# Patient Record
Sex: FEMALE | Race: BLACK OR AFRICAN AMERICAN | NOT HISPANIC OR LATINO | Employment: STUDENT | RURAL
[De-identification: names, ages, dates, MRNs, and addresses within clinical notes are randomized per-mention and may not be internally consistent; named-entity substitution may affect disease eponyms.]

---
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
## 2023-01-04 ENCOUNTER — OFFICE VISIT (OUTPATIENT)
Dept: FAMILY MEDICINE | Facility: CLINIC | Age: 18
End: 2023-01-04
Payer: MEDICAID

## 2023-01-04 VITALS
HEIGHT: 64 IN | OXYGEN SATURATION: 98 % | BODY MASS INDEX: 21.34 KG/M2 | DIASTOLIC BLOOD PRESSURE: 60 MMHG | TEMPERATURE: 98 F | SYSTOLIC BLOOD PRESSURE: 102 MMHG | WEIGHT: 125 LBS | HEART RATE: 80 BPM

## 2023-01-04 DIAGNOSIS — B00.1 FEVER BLISTER: Primary | ICD-10-CM

## 2023-01-04 DIAGNOSIS — F33.1 MODERATE EPISODE OF RECURRENT MAJOR DEPRESSIVE DISORDER: ICD-10-CM

## 2023-01-04 PROCEDURE — 3008F PR BODY MASS INDEX (BMI) DOCUMENTED: ICD-10-PCS | Mod: ,,, | Performed by: FAMILY MEDICINE

## 2023-01-04 PROCEDURE — 3074F PR MOST RECENT SYSTOLIC BLOOD PRESSURE < 130 MM HG: ICD-10-PCS | Mod: ,,, | Performed by: FAMILY MEDICINE

## 2023-01-04 PROCEDURE — 3008F BODY MASS INDEX DOCD: CPT | Mod: ,,, | Performed by: FAMILY MEDICINE

## 2023-01-04 PROCEDURE — 99213 OFFICE O/P EST LOW 20 MIN: CPT | Mod: ,,, | Performed by: FAMILY MEDICINE

## 2023-01-04 PROCEDURE — 3074F SYST BP LT 130 MM HG: CPT | Mod: ,,, | Performed by: FAMILY MEDICINE

## 2023-01-04 PROCEDURE — 3078F PR MOST RECENT DIASTOLIC BLOOD PRESSURE < 80 MM HG: ICD-10-PCS | Mod: ,,, | Performed by: FAMILY MEDICINE

## 2023-01-04 PROCEDURE — 99213 PR OFFICE/OUTPT VISIT, EST, LEVL III, 20-29 MIN: ICD-10-PCS | Mod: ,,, | Performed by: FAMILY MEDICINE

## 2023-01-04 PROCEDURE — 3078F DIAST BP <80 MM HG: CPT | Mod: ,,, | Performed by: FAMILY MEDICINE

## 2023-01-04 RX ORDER — ACYCLOVIR 800 MG/1
800 TABLET ORAL
Qty: 35 TABLET | Refills: 1 | Status: SHIPPED | OUTPATIENT
Start: 2023-01-04 | End: 2024-01-04

## 2023-01-04 NOTE — PROGRESS NOTES
Hussein Cain MD   [facility] [place]  [address] [place] [address]     PATIENT NAME: Vasiliy Ferrell  : 2005  DATE: 23  MRN: [id number]      Billing Provider: Hussein Cain MD  Level of Service:   Patient PCP Information       Provider PCP Type    Hussein Cain MD General            Reason for Visit / Chief Complaint: Depression (Discuss changing her BCP because she thinks it is making her depressed. States she is taking Lexapro 10 mg, but does not think it is helping.) and Blister (Blister x 2 to upper lip for 1 week)         History of Present Illness / Problem Focused Workflow     Vasiliy Ferrell presents to the clinic with Depression (Discuss changing her BCP because she thinks it is making her depressed. States she is taking Lexapro 10 mg, but does not think it is helping.) and Blister (Blister x 2 to upper lip for 1 week)     HPI    Review of Systems     Review of Systems   Constitutional:  Negative for activity change, appetite change, fatigue and fever.   HENT:  Negative for nasal congestion, ear pain, hearing loss, sinus pressure/congestion and sore throat.    Respiratory:  Negative for cough, chest tightness and shortness of breath.    Cardiovascular:  Negative for chest pain and palpitations.   Gastrointestinal:  Negative for abdominal pain and fecal incontinence.   Genitourinary:  Negative for bladder incontinence and difficulty urinating.   Musculoskeletal:  Negative for arthralgias.   Integumentary:  Negative for rash.   Neurological:  Negative for dizziness and headaches.      Medical / Social / Family History   History reviewed. No pertinent past medical history.    Past Surgical History:   Procedure Laterality Date    BREAST SURGERY Left     cyst extraction       Social History  Vasiliy Ferrell  reports that she has never smoked. She has never used smokeless tobacco. She reports that she does not currently use drugs after having used the  following drugs: Marijuana. She reports that she does not drink alcohol.    Family History  Vasiliy Ferrell  family history includes Diabetes in her maternal grandmother and maternal uncle; Hypertension in her maternal grandmother and maternal uncle.    Medications and Allergies     Medications  Outpatient Medications Marked as Taking for the 1/4/23 encounter (Office Visit) with Hussein Cain MD   Medication Sig Dispense Refill    cetirizine (ZYRTEC) 5 MG tablet Take 2 tablets (10 mg total) by mouth once daily. 90 tablet 1    EScitalopram oxalate (LEXAPRO) 10 MG tablet Take 1 tablet (10 mg total) by mouth once daily. 90 tablet 0    norgestimate-ethinyl estradioL (ORTHO TRI-CYCLEN LO) 0.18/0.215/0.25 mg-25 mcg tablet Take 1 tablet by mouth once daily. 30 tablet 11       Allergies  Review of patient's allergies indicates:  No Known Allergies    Physical Examination     Vitals:    01/04/23 0915   BP: 102/60   Pulse: 80   Temp: 97.7 °F (36.5 °C)     Physical Exam  Constitutional:       General: She is not in acute distress.     Appearance: She is not ill-appearing.   HENT:      Head: Normocephalic and atraumatic.      Right Ear: Tympanic membrane and ear canal normal.      Left Ear: Tympanic membrane and ear canal normal.      Nose: Nose normal. No congestion or rhinorrhea.   Eyes:      Pupils: Pupils are equal, round, and reactive to light.   Cardiovascular:      Rate and Rhythm: Normal rate and regular rhythm.      Pulses: Normal pulses.      Heart sounds: No murmur heard.  Pulmonary:      Effort: No respiratory distress.      Breath sounds: No wheezing, rhonchi or rales.   Abdominal:      General: Bowel sounds are normal.      Palpations: Abdomen is soft.      Tenderness: There is no abdominal tenderness.      Hernia: No hernia is present.   Musculoskeletal:      Cervical back: Normal range of motion and neck supple.   Lymphadenopathy:      Cervical: No cervical adenopathy.   Skin:     General: Skin is warm and  dry.   Neurological:      Mental Status: She is alert.   Psychiatric:         Behavior: Behavior normal.         Thought Content: Thought content normal.        Assessment and Plan (including Health Maintenance)   :    Plan:         Health Maintenance Due   Topic Date Due    Hepatitis C Screening  Never done    Lipid Panel  Never done    COVID-19 Vaccine (1) Never done    HPV Vaccines (1 - 2-dose series) Never done    HIV Screening  Never done    Chlamydia Screening  Never done    TETANUS VACCINE  Never done    Influenza Vaccine (1) Never done       Problem List Items Addressed This Visit    None    There are no diagnoses linked to this encounter.   The patient has no Health Maintenance topics of status Not Due    Procedures     No future appointments.     No follow-ups on file.       Signature:  Hussein Cain MD  [facility]  [address] [place] [address]  [phone] Phone  [phone] Fax    Date of encounter: 1/4/23

## 2023-06-29 ENCOUNTER — OFFICE VISIT (OUTPATIENT)
Dept: FAMILY MEDICINE | Facility: CLINIC | Age: 18
End: 2023-06-29
Payer: MEDICAID

## 2023-06-29 VITALS
HEIGHT: 64 IN | DIASTOLIC BLOOD PRESSURE: 64 MMHG | HEART RATE: 75 BPM | BODY MASS INDEX: 22.57 KG/M2 | TEMPERATURE: 99 F | OXYGEN SATURATION: 98 % | WEIGHT: 132.19 LBS | SYSTOLIC BLOOD PRESSURE: 108 MMHG

## 2023-06-29 DIAGNOSIS — Z79.899 LONG-TERM USE OF HIGH-RISK MEDICATION: Primary | ICD-10-CM

## 2023-06-29 DIAGNOSIS — F33.1 MODERATE EPISODE OF RECURRENT MAJOR DEPRESSIVE DISORDER: ICD-10-CM

## 2023-06-29 LAB
ALBUMIN SERPL BCP-MCNC: 3.9 G/DL (ref 3.5–5)
ALBUMIN/GLOB SERPL: 1 {RATIO}
ALP SERPL-CCNC: 59 U/L (ref 52–144)
ALT SERPL W P-5'-P-CCNC: 13 U/L (ref 13–56)
ANION GAP SERPL CALCULATED.3IONS-SCNC: 12 MMOL/L (ref 7–16)
AST SERPL W P-5'-P-CCNC: 10 U/L (ref 15–37)
BASOPHILS # BLD AUTO: 0.06 K/UL (ref 0–0.2)
BASOPHILS NFR BLD AUTO: 0.8 % (ref 0–1)
BILIRUB SERPL-MCNC: 0.6 MG/DL (ref ?–1.2)
BUN SERPL-MCNC: 9 MG/DL (ref 7–18)
BUN/CREAT SERPL: 14 (ref 6–20)
CALCIUM SERPL-MCNC: 9.2 MG/DL (ref 8.5–10.1)
CHLORIDE SERPL-SCNC: 106 MMOL/L (ref 98–107)
CO2 SERPL-SCNC: 26 MMOL/L (ref 21–32)
CREAT SERPL-MCNC: 0.64 MG/DL (ref 0.55–1.02)
DIFFERENTIAL METHOD BLD: ABNORMAL
EGFR (NO RACE VARIABLE) (RUSH/TITUS): 131 ML/MIN/1.73M2
EOSINOPHIL # BLD AUTO: 0.34 K/UL (ref 0–0.5)
EOSINOPHIL NFR BLD AUTO: 4.8 % (ref 1–4)
ERYTHROCYTE [DISTWIDTH] IN BLOOD BY AUTOMATED COUNT: 12 % (ref 11.5–14.5)
GLOBULIN SER-MCNC: 3.8 G/DL (ref 2–4)
GLUCOSE SERPL-MCNC: 75 MG/DL (ref 74–106)
HCT VFR BLD AUTO: 39.6 % (ref 38–47)
HGB BLD-MCNC: 12.8 G/DL (ref 12–16)
IMM GRANULOCYTES # BLD AUTO: 0.01 K/UL (ref 0–0.04)
IMM GRANULOCYTES NFR BLD: 0.1 % (ref 0–0.4)
LYMPHOCYTES # BLD AUTO: 2.24 K/UL (ref 1–4.8)
LYMPHOCYTES NFR BLD AUTO: 31.5 % (ref 27–41)
MCH RBC QN AUTO: 29.6 PG (ref 27–31)
MCHC RBC AUTO-ENTMCNC: 32.3 G/DL (ref 32–36)
MCV RBC AUTO: 91.5 FL (ref 80–96)
MONOCYTES # BLD AUTO: 0.37 K/UL (ref 0–0.8)
MONOCYTES NFR BLD AUTO: 5.2 % (ref 2–6)
MPC BLD CALC-MCNC: 10.9 FL (ref 9.4–12.4)
NEUTROPHILS # BLD AUTO: 4.09 K/UL (ref 1.8–7.7)
NEUTROPHILS NFR BLD AUTO: 57.6 % (ref 53–65)
NRBC # BLD AUTO: 0 X10E3/UL
NRBC, AUTO (.00): 0 %
PLATELET # BLD AUTO: 353 K/UL (ref 150–400)
POTASSIUM SERPL-SCNC: 4.2 MMOL/L (ref 3.5–5.1)
PROT SERPL-MCNC: 7.7 G/DL (ref 6.4–8.2)
RBC # BLD AUTO: 4.33 M/UL (ref 4.2–5.4)
SODIUM SERPL-SCNC: 140 MMOL/L (ref 136–145)
WBC # BLD AUTO: 7.11 K/UL (ref 4.5–11)

## 2023-06-29 PROCEDURE — 3008F PR BODY MASS INDEX (BMI) DOCUMENTED: ICD-10-PCS | Mod: ,,, | Performed by: FAMILY MEDICINE

## 2023-06-29 PROCEDURE — 99213 PR OFFICE/OUTPT VISIT, EST, LEVL III, 20-29 MIN: ICD-10-PCS | Mod: ,,, | Performed by: FAMILY MEDICINE

## 2023-06-29 PROCEDURE — 85025 COMPLETE CBC W/AUTO DIFF WBC: CPT | Mod: ,,, | Performed by: CLINICAL MEDICAL LABORATORY

## 2023-06-29 PROCEDURE — 3008F BODY MASS INDEX DOCD: CPT | Mod: ,,, | Performed by: FAMILY MEDICINE

## 2023-06-29 PROCEDURE — 99213 OFFICE O/P EST LOW 20 MIN: CPT | Mod: ,,, | Performed by: FAMILY MEDICINE

## 2023-06-29 PROCEDURE — 85025 CBC WITH DIFFERENTIAL: ICD-10-PCS | Mod: ,,, | Performed by: CLINICAL MEDICAL LABORATORY

## 2023-06-29 PROCEDURE — 80053 COMPREHEN METABOLIC PANEL: CPT | Mod: ,,, | Performed by: CLINICAL MEDICAL LABORATORY

## 2023-06-29 PROCEDURE — 3078F PR MOST RECENT DIASTOLIC BLOOD PRESSURE < 80 MM HG: ICD-10-PCS | Mod: ,,, | Performed by: FAMILY MEDICINE

## 2023-06-29 PROCEDURE — 3078F DIAST BP <80 MM HG: CPT | Mod: ,,, | Performed by: FAMILY MEDICINE

## 2023-06-29 PROCEDURE — 3074F PR MOST RECENT SYSTOLIC BLOOD PRESSURE < 130 MM HG: ICD-10-PCS | Mod: ,,, | Performed by: FAMILY MEDICINE

## 2023-06-29 PROCEDURE — 80053 COMPREHENSIVE METABOLIC PANEL: ICD-10-PCS | Mod: ,,, | Performed by: CLINICAL MEDICAL LABORATORY

## 2023-06-29 PROCEDURE — 3074F SYST BP LT 130 MM HG: CPT | Mod: ,,, | Performed by: FAMILY MEDICINE

## 2023-06-29 RX ORDER — ESCITALOPRAM OXALATE 10 MG/1
10 TABLET ORAL DAILY
Qty: 90 TABLET | Refills: 1 | Status: SHIPPED | OUTPATIENT
Start: 2023-06-29 | End: 2023-11-07 | Stop reason: SDUPTHER

## 2023-06-29 NOTE — PROGRESS NOTES
Hussein Cain MD   [facility]  [address] [place] [address]     PATIENT NAME: Vasiliy Ferrell  : 2005  DATE: 23  MRN: [id number]      Billing Provider: Hussein Cain MD  Level of Service: [place] OFFICE/OUTPT VISIT, EST, LEVL III, 20-29 MIN  Patient PCP Information       Provider PCP Type    Hussein Cain MD General            Reason for Visit / Chief Complaint: Depression (Refill on lexapro)         History of Present Illness / Problem Focused Workflow     Vasiliy Ferrell presents to the clinic with Depression (Refill on lexapro)     HPI    Review of Systems     Review of Systems   Constitutional:  Negative for activity change, appetite change, fatigue and fever.   HENT:  Negative for nasal congestion, ear pain, hearing loss, sinus pressure/congestion and sore throat.    Respiratory:  Negative for cough, chest tightness and shortness of breath.    Cardiovascular:  Negative for chest pain and palpitations.   Gastrointestinal:  Negative for abdominal pain and fecal incontinence.   Genitourinary:  Negative for bladder incontinence and difficulty urinating.   Musculoskeletal:  Negative for arthralgias.   Integumentary:  Negative for rash.   Neurological:  Negative for dizziness and headaches.      Medical / Social / Family History   History reviewed. No pertinent past medical history.    Past Surgical History:   Procedure Laterality Date    BREAST SURGERY Left     cyst extraction       Social History  Vasiliy Ferrell  reports that she has never smoked. She has never used smokeless tobacco. She reports that she does not currently use drugs after having used the following drugs: Marijuana. She reports that she does not drink alcohol.    Family History  Vasiliy Ferrell  family history includes Diabetes in her maternal grandmother and maternal uncle; Hypertension in her maternal grandmother and maternal uncle.    Medications and Allergies     Medications  Outpatient  Medications Marked as Taking for the 6/29/23 encounter (Office Visit) with Hussein Cain MD   Medication Sig Dispense Refill    cetirizine (ZYRTEC) 5 MG tablet Take 2 tablets (10 mg total) by mouth once daily. 90 tablet 1    norethindrone-ethinyl estradiol (ORTHO-NOVUM 7/7/7, 28,) 0.5/0.75/1 mg- 35 mcg per tablet Take 1 tablet by mouth once daily. 30 tablet 11    [DISCONTINUED] EScitalopram oxalate (LEXAPRO) 10 MG tablet Take 1 tablet (10 mg total) by mouth once daily. 90 tablet 0       Allergies  Review of patient's allergies indicates:  No Known Allergies    Physical Examination     Vitals:    06/29/23 0905   BP: 108/64   Pulse: 75   Temp: 98.5 °F (36.9 °C)     Physical Exam  Constitutional:       General: She is not in acute distress.     Appearance: She is not ill-appearing.   HENT:      Head: Normocephalic and atraumatic.      Right Ear: Tympanic membrane and ear canal normal.      Left Ear: Tympanic membrane and ear canal normal.      Nose: Nose normal. No congestion or rhinorrhea.   Eyes:      Pupils: Pupils are equal, round, and reactive to light.   Cardiovascular:      Rate and Rhythm: Normal rate and regular rhythm.      Pulses: Normal pulses.      Heart sounds: No murmur heard.  Pulmonary:      Effort: No respiratory distress.      Breath sounds: No wheezing, rhonchi or rales.   Abdominal:      General: Bowel sounds are normal.      Palpations: Abdomen is soft.      Tenderness: There is no abdominal tenderness.      Hernia: No hernia is present.   Musculoskeletal:      Cervical back: Normal range of motion and neck supple.   Lymphadenopathy:      Cervical: No cervical adenopathy.   Skin:     General: Skin is warm and dry.   Neurological:      Mental Status: She is alert.   Psychiatric:         Behavior: Behavior normal.         Thought Content: Thought content normal.        Assessment and Plan (including Health Maintenance)   :    Plan:         Health Maintenance Due   Topic Date Due    Hepatitis  C Screening  Never done    Lipid Panel  Never done    COVID-19 Vaccine (1) Never done    HPV Vaccines (1 - 2-dose series) Never done    HIV Screening  Never done    TETANUS VACCINE  Never done    Chlamydia Screening  09/30/2022       Problem List Items Addressed This Visit    None  Visit Diagnoses       Long-term use of high-risk medication    -  Primary    Relevant Orders    CBC Auto Differential    Comprehensive Metabolic Panel    Moderate episode of recurrent major depressive disorder              Long-term use of high-risk medication  -     CBC Auto Differential; Future; Expected date: 06/29/2023  -     Comprehensive Metabolic Panel; Future; Expected date: 06/29/2023    Moderate episode of recurrent major depressive disorder    Other orders  -     EScitalopram oxalate (LEXAPRO) 10 MG tablet; Take 1 tablet (10 mg total) by mouth once daily.  Dispense: 90 tablet; Refill: 1       Health Maintenance Topics with due status: Not Due       Topic Last Completion Date    Influenza Vaccine Not Due       Procedures     No future appointments.     No follow-ups on file.       Signature:  Hussein Cain MD  [facility]  [address] [place] [address]  [phone] Phone  [phone] Fax    Date of encounter: 6/29/23

## 2023-07-11 ENCOUNTER — TELEPHONE (OUTPATIENT)
Dept: FAMILY MEDICINE | Facility: CLINIC | Age: 18
End: 2023-07-11
Payer: MEDICAID

## 2023-07-11 NOTE — TELEPHONE ENCOUNTER
Spoke with pharmacy. Patient has refills at pharmacy. Patient will be required to pay cash price. Patient verbalized understanding.

## 2023-07-11 NOTE — TELEPHONE ENCOUNTER
----- Message from Ary Killian sent at 7/11/2023 12:23 PM CDT -----  Pt lost a month's rx for bc. Needs some sent to [facility].  [phone]

## 2023-11-06 RX ORDER — NORETHINDRONE AND ETHINYL ESTRADIOL 7 DAYS X 3
1 KIT ORAL
Qty: 90 TABLET | Refills: 0 | Status: SHIPPED | OUTPATIENT
Start: 2023-11-06 | End: 2023-11-07 | Stop reason: SDUPTHER

## 2023-11-06 NOTE — TELEPHONE ENCOUNTER
----- Message from Tanya Wilkerson sent at 11/6/2023  9:13 AM CST -----  Pt needs a refill on BCP's. She is out. Call pt to let her know if she needs an appt to get this or can this be called in. [phone].

## 2023-11-07 ENCOUNTER — OFFICE VISIT (OUTPATIENT)
Dept: FAMILY MEDICINE | Facility: CLINIC | Age: 18
End: 2023-11-07
Payer: MEDICAID

## 2023-11-07 VITALS
OXYGEN SATURATION: 99 % | BODY MASS INDEX: 24.04 KG/M2 | WEIGHT: 140.81 LBS | TEMPERATURE: 98 F | HEART RATE: 67 BPM | HEIGHT: 64 IN | SYSTOLIC BLOOD PRESSURE: 102 MMHG | DIASTOLIC BLOOD PRESSURE: 62 MMHG

## 2023-11-07 DIAGNOSIS — Z11.8 SCREENING FOR CHLAMYDIAL DISEASE: ICD-10-CM

## 2023-11-07 DIAGNOSIS — Z30.41 ENCOUNTER FOR SURVEILLANCE OF CONTRACEPTIVE PILLS: Primary | ICD-10-CM

## 2023-11-07 LAB
B-HCG UR QL: NEGATIVE
CTP QC/QA: YES

## 2023-11-07 PROCEDURE — 3008F BODY MASS INDEX DOCD: CPT | Mod: ,,, | Performed by: FAMILY MEDICINE

## 2023-11-07 PROCEDURE — 87591 CHLAMYDIA/GONORRHOEAE(GC), PCR: ICD-10-PCS | Mod: ,,, | Performed by: CLINICAL MEDICAL LABORATORY

## 2023-11-07 PROCEDURE — 3074F PR MOST RECENT SYSTOLIC BLOOD PRESSURE < 130 MM HG: ICD-10-PCS | Mod: ,,, | Performed by: FAMILY MEDICINE

## 2023-11-07 PROCEDURE — 87491 CHLMYD TRACH DNA AMP PROBE: CPT | Mod: ,,, | Performed by: CLINICAL MEDICAL LABORATORY

## 2023-11-07 PROCEDURE — 99213 PR OFFICE/OUTPT VISIT, EST, LEVL III, 20-29 MIN: ICD-10-PCS | Mod: ,,, | Performed by: FAMILY MEDICINE

## 2023-11-07 PROCEDURE — 3008F PR BODY MASS INDEX (BMI) DOCUMENTED: ICD-10-PCS | Mod: ,,, | Performed by: FAMILY MEDICINE

## 2023-11-07 PROCEDURE — 3074F SYST BP LT 130 MM HG: CPT | Mod: ,,, | Performed by: FAMILY MEDICINE

## 2023-11-07 PROCEDURE — 81025 POCT URINE PREGNANCY: ICD-10-PCS | Mod: QW,,, | Performed by: FAMILY MEDICINE

## 2023-11-07 PROCEDURE — 3078F PR MOST RECENT DIASTOLIC BLOOD PRESSURE < 80 MM HG: ICD-10-PCS | Mod: ,,, | Performed by: FAMILY MEDICINE

## 2023-11-07 PROCEDURE — 3078F DIAST BP <80 MM HG: CPT | Mod: ,,, | Performed by: FAMILY MEDICINE

## 2023-11-07 PROCEDURE — 81025 URINE PREGNANCY TEST: CPT | Mod: QW,,, | Performed by: FAMILY MEDICINE

## 2023-11-07 PROCEDURE — 99213 OFFICE O/P EST LOW 20 MIN: CPT | Mod: ,,, | Performed by: FAMILY MEDICINE

## 2023-11-07 PROCEDURE — 87591 N.GONORRHOEAE DNA AMP PROB: CPT | Mod: ,,, | Performed by: CLINICAL MEDICAL LABORATORY

## 2023-11-07 PROCEDURE — 87491 CHLAMYDIA/GONORRHOEAE(GC), PCR: ICD-10-PCS | Mod: ,,, | Performed by: CLINICAL MEDICAL LABORATORY

## 2023-11-07 RX ORDER — ESCITALOPRAM OXALATE 10 MG/1
10 TABLET ORAL DAILY
Qty: 90 TABLET | Refills: 1 | Status: SHIPPED | OUTPATIENT
Start: 2023-11-07

## 2023-11-07 RX ORDER — CETIRIZINE HYDROCHLORIDE 5 MG/1
10 TABLET ORAL DAILY
Qty: 90 TABLET | Refills: 1 | Status: SHIPPED | OUTPATIENT
Start: 2023-11-07 | End: 2024-03-06 | Stop reason: SDUPTHER

## 2023-11-07 RX ORDER — NORETHINDRONE AND ETHINYL ESTRADIOL 7 DAYS X 3
1 KIT ORAL DAILY
Qty: 90 TABLET | Refills: 3 | Status: SHIPPED | OUTPATIENT
Start: 2023-11-07

## 2023-11-07 NOTE — PROGRESS NOTES
Hussein Cain MD   [facility]  [address] [place] [address]     PATIENT NAME: Vasiliy Ferrell  : 2005  DATE: 23  MRN: [id number]      Billing Provider: Hussein Cain MD  Level of Service: [place] OFFICE/OUTPT VISIT, EST, LEVL III, 20-29 MIN  Patient PCP Information       Provider PCP Type    Hussein Cain MD General            Reason for Visit / Chief Complaint: Contraception (Yearly check up for birth control refill. Last PAP performed on 2022. Patient took last BC on Saturday. )         History of Present Illness / Problem Focused Workflow     Vasiliy Ferrell presents to the clinic with Contraception (Yearly check up for birth control refill. Last PAP performed on 2022. Patient took last BC on Saturday. )     HPI    Review of Systems     Review of Systems   Constitutional:  Negative for activity change, appetite change, fatigue and fever.   HENT:  Negative for nasal congestion, ear pain, hearing loss, sinus pressure/congestion and sore throat.    Respiratory:  Negative for cough, chest tightness and shortness of breath.    Cardiovascular:  Negative for chest pain and palpitations.   Gastrointestinal:  Negative for abdominal pain and fecal incontinence.   Genitourinary:  Negative for bladder incontinence and difficulty urinating.   Musculoskeletal:  Negative for arthralgias.   Integumentary:  Negative for rash.   Neurological:  Negative for dizziness and headaches.        Medical / Social / Family History   History reviewed. No pertinent past medical history.    Past Surgical History:   Procedure Laterality Date    BREAST SURGERY Left     cyst extraction       Social History  Vasiliy Ferrell  reports that she has never smoked. She has never used smokeless tobacco. She reports that she does not currently use drugs after having used the following drugs: Marijuana. She reports that she does not drink alcohol.    Family History  Vasiliy Ferrell  family  history includes Diabetes in her maternal grandmother and maternal uncle; Hypertension in her maternal grandmother and maternal uncle.    Medications and Allergies     Medications  Outpatient Medications Marked as Taking for the 11/7/23 encounter (Office Visit) with Hussein Cain MD   Medication Sig Dispense Refill    [DISCONTINUED] cetirizine (ZYRTEC) 5 MG tablet Take 2 tablets (10 mg total) by mouth once daily. 90 tablet 1    [DISCONTINUED] DASETTA 7/7/7, 28, 0.5/0.75/1 mg- 35 mcg per tablet TAKE 1 TABLET BY MOUTH ONCE DAILY. 90 tablet 0    [DISCONTINUED] EScitalopram oxalate (LEXAPRO) 10 MG tablet Take 1 tablet (10 mg total) by mouth once daily. 90 tablet 1       Allergies  Review of patient's allergies indicates:  No Known Allergies    Physical Examination     Vitals:    11/07/23 0804   BP: 102/62   Pulse: 67   Temp: 97.7 °F (36.5 °C)     Physical Exam  Constitutional:       General: She is not in acute distress.     Appearance: She is not ill-appearing.   HENT:      Head: Normocephalic and atraumatic.      Right Ear: Tympanic membrane and ear canal normal.      Left Ear: Tympanic membrane and ear canal normal.      Nose: Nose normal. No congestion or rhinorrhea.   Eyes:      Pupils: Pupils are equal, round, and reactive to light.   Cardiovascular:      Rate and Rhythm: Normal rate and regular rhythm.      Pulses: Normal pulses.      Heart sounds: No murmur heard.  Pulmonary:      Effort: No respiratory distress.      Breath sounds: No wheezing, rhonchi or rales.   Abdominal:      General: Bowel sounds are normal.      Palpations: Abdomen is soft.      Tenderness: There is no abdominal tenderness.      Hernia: No hernia is present.   Musculoskeletal:      Cervical back: Normal range of motion and neck supple.   Lymphadenopathy:      Cervical: No cervical adenopathy.   Skin:     General: Skin is warm and dry.   Neurological:      Mental Status: She is alert.   Psychiatric:         Behavior: Behavior  normal.         Thought Content: Thought content normal.          Assessment and Plan (including Health Maintenance)   :    Plan:         Health Maintenance Due   Topic Date Due    Hepatitis C Screening  Never done    Lipid Panel  Never done    COVID-19 Vaccine (1) Never done    HPV Vaccines (1 - 2-dose series) Never done    HIV Screening  Never done    TETANUS VACCINE  Never done    Influenza Vaccine (1) Never done       Problem List Items Addressed This Visit    None  Visit Diagnoses       Encounter for surveillance of contraceptive pills    -  Primary    Relevant Orders    POCT urine pregnancy (Completed)    Screening for chlamydial disease        Relevant Orders    Chlamydia/GC, PCR (Completed)          Encounter for surveillance of contraceptive pills  -     POCT urine pregnancy    Screening for chlamydial disease  -     Chlamydia/GC, PCR; Future; Expected date: 11/07/2023    Other orders  -     EScitalopram oxalate (LEXAPRO) 10 MG tablet; Take 1 tablet (10 mg total) by mouth once daily.  Dispense: 90 tablet; Refill: 1  -     norethindrone-ethinyl estradiol (DASETTA 7/7/7, 28,) 0.5/0.75/1 mg- 35 mcg per tablet; Take 1 tablet by mouth once daily.  Dispense: 90 tablet; Refill: 3  -     cetirizine (ZYRTEC) 5 MG tablet; Take 2 tablets (10 mg total) by mouth once daily.  Dispense: 90 tablet; Refill: 1       Health Maintenance Topics with due status: Not Due       Topic Last Completion Date    Chlamydia Screening 11/07/2023       Procedures     No future appointments.     No follow-ups on file.       Signature:  Hussein Cain MD  [facility]  [address] [place] [address]  [phone] Phone  [phone] Fax    Date of encounter: 11/7/23

## 2023-11-08 LAB
CHLAMYDIA BY PCR: POSITIVE
N. GONORRHOEAE (GC) BY PCR: NEGATIVE

## 2023-11-08 RX ORDER — AZITHROMYCIN 1 G/1
1 POWDER, FOR SUSPENSION ORAL ONCE
Qty: 1 PACKET | Refills: 0 | Status: SHIPPED | OUTPATIENT
Start: 2023-11-08 | End: 2023-11-08

## 2023-12-14 ENCOUNTER — OFFICE VISIT (OUTPATIENT)
Dept: FAMILY MEDICINE | Facility: CLINIC | Age: 18
End: 2023-12-14
Payer: MEDICAID

## 2023-12-14 VITALS
HEIGHT: 64 IN | SYSTOLIC BLOOD PRESSURE: 104 MMHG | DIASTOLIC BLOOD PRESSURE: 62 MMHG | BODY MASS INDEX: 24.84 KG/M2 | TEMPERATURE: 99 F | OXYGEN SATURATION: 98 % | HEART RATE: 101 BPM | WEIGHT: 145.5 LBS

## 2023-12-14 DIAGNOSIS — A74.9 CHLAMYDIA: ICD-10-CM

## 2023-12-14 DIAGNOSIS — Z72.51 HIGH RISK SEXUAL BEHAVIOR, UNSPECIFIED TYPE: Primary | ICD-10-CM

## 2023-12-14 PROCEDURE — 3078F DIAST BP <80 MM HG: CPT | Mod: ,,, | Performed by: FAMILY MEDICINE

## 2023-12-14 PROCEDURE — 3008F BODY MASS INDEX DOCD: CPT | Mod: ,,, | Performed by: FAMILY MEDICINE

## 2023-12-14 PROCEDURE — 3078F PR MOST RECENT DIASTOLIC BLOOD PRESSURE < 80 MM HG: ICD-10-PCS | Mod: ,,, | Performed by: FAMILY MEDICINE

## 2023-12-14 PROCEDURE — 87491 CHLMYD TRACH DNA AMP PROBE: CPT | Mod: ,,, | Performed by: CLINICAL MEDICAL LABORATORY

## 2023-12-14 PROCEDURE — 87491 CHLAMYDIA/GONORRHOEAE(GC), PCR: ICD-10-PCS | Mod: ,,, | Performed by: CLINICAL MEDICAL LABORATORY

## 2023-12-14 PROCEDURE — 96372 THER/PROPH/DIAG INJ SC/IM: CPT | Mod: ,,, | Performed by: FAMILY MEDICINE

## 2023-12-14 PROCEDURE — 99213 PR OFFICE/OUTPT VISIT, EST, LEVL III, 20-29 MIN: ICD-10-PCS | Mod: 25,,, | Performed by: FAMILY MEDICINE

## 2023-12-14 PROCEDURE — 99213 OFFICE O/P EST LOW 20 MIN: CPT | Mod: 25,,, | Performed by: FAMILY MEDICINE

## 2023-12-14 PROCEDURE — 3074F SYST BP LT 130 MM HG: CPT | Mod: ,,, | Performed by: FAMILY MEDICINE

## 2023-12-14 PROCEDURE — 96372 PR INJECTION,THERAP/PROPH/DIAG2ST, IM OR SUBCUT: ICD-10-PCS | Mod: ,,, | Performed by: FAMILY MEDICINE

## 2023-12-14 PROCEDURE — 3074F PR MOST RECENT SYSTOLIC BLOOD PRESSURE < 130 MM HG: ICD-10-PCS | Mod: ,,, | Performed by: FAMILY MEDICINE

## 2023-12-14 PROCEDURE — 3008F PR BODY MASS INDEX (BMI) DOCUMENTED: ICD-10-PCS | Mod: ,,, | Performed by: FAMILY MEDICINE

## 2023-12-14 PROCEDURE — 87591 N.GONORRHOEAE DNA AMP PROB: CPT | Mod: ,,, | Performed by: CLINICAL MEDICAL LABORATORY

## 2023-12-14 PROCEDURE — 87591 CHLAMYDIA/GONORRHOEAE(GC), PCR: ICD-10-PCS | Mod: ,,, | Performed by: CLINICAL MEDICAL LABORATORY

## 2023-12-14 RX ORDER — AZITHROMYCIN 500 MG/1
1000 TABLET, FILM COATED ORAL ONCE
Qty: 2 TABLET | Refills: 0 | Status: SHIPPED | OUTPATIENT
Start: 2023-12-14 | End: 2023-12-14

## 2023-12-14 RX ORDER — METHYLPREDNISOLONE ACETATE 80 MG/ML
40 INJECTION, SUSPENSION INTRA-ARTICULAR; INTRALESIONAL; INTRAMUSCULAR; SOFT TISSUE
Status: COMPLETED | OUTPATIENT
Start: 2023-12-14 | End: 2023-12-14

## 2023-12-14 RX ORDER — DEXAMETHASONE SODIUM PHOSPHATE 4 MG/ML
4 INJECTION, SOLUTION INTRA-ARTICULAR; INTRALESIONAL; INTRAMUSCULAR; INTRAVENOUS; SOFT TISSUE
Status: COMPLETED | OUTPATIENT
Start: 2023-12-14 | End: 2023-12-14

## 2023-12-14 RX ORDER — CEFTRIAXONE 1 G/1
1 INJECTION, POWDER, FOR SOLUTION INTRAMUSCULAR; INTRAVENOUS
Status: COMPLETED | OUTPATIENT
Start: 2023-12-14 | End: 2023-12-14

## 2023-12-14 RX ADMIN — METHYLPREDNISOLONE ACETATE 40 MG: 80 INJECTION, SUSPENSION INTRA-ARTICULAR; INTRALESIONAL; INTRAMUSCULAR; SOFT TISSUE at 10:12

## 2023-12-14 RX ADMIN — CEFTRIAXONE 1 G: 1 INJECTION, POWDER, FOR SOLUTION INTRAMUSCULAR; INTRAVENOUS at 10:12

## 2023-12-14 RX ADMIN — DEXAMETHASONE SODIUM PHOSPHATE 4 MG: 4 INJECTION, SOLUTION INTRA-ARTICULAR; INTRALESIONAL; INTRAMUSCULAR; INTRAVENOUS; SOFT TISSUE at 10:12

## 2023-12-14 NOTE — PROGRESS NOTES
Hussein Cain MD   [facility]  [address] [place] [address]     PATIENT NAME: Vasiliy Ferrell  : 2005  DATE: 23  MRN: [id number]      Billing Provider: Hussein Cain MD  Level of Service: [place] OFFICE/OUTPT VISIT, EST, LEVL III, 20-29 MIN  Patient PCP Information       Provider PCP Type    Hussein Cain MD General            Reason for Visit / Chief Complaint: Sinus Problem (C/o headache, cough, sneezing and runny nose - started this morning) and Exposure to STD (Patient positive for chlamydia on 23. Wants to be rechecked.)         History of Present Illness / Problem Focused Workflow     Vasiliy Ferrell presents to the clinic with Sinus Problem (C/o headache, cough, sneezing and runny nose - started this morning) and Exposure to STD (Patient positive for chlamydia on 23. Wants to be rechecked.)     HPI    Review of Systems     Review of Systems   Constitutional:  Negative for activity change, appetite change, fatigue and fever.   HENT:  Positive for nasal congestion, sinus pressure/congestion and sore throat. Negative for ear pain and hearing loss.    Respiratory:  Positive for cough. Negative for chest tightness and shortness of breath.    Cardiovascular:  Negative for chest pain and palpitations.   Gastrointestinal:  Negative for abdominal pain and fecal incontinence.   Genitourinary:  Negative for bladder incontinence and difficulty urinating.   Musculoskeletal:  Negative for arthralgias.   Integumentary:  Negative for rash.   Neurological:  Negative for dizziness and headaches.        Medical / Social / Family History   History reviewed. No pertinent past medical history.    Past Surgical History:   Procedure Laterality Date    BREAST SURGERY Left     cyst extraction       Social History  Vasiliy Ferrell  reports that she has never smoked. She has never used smokeless tobacco. She reports that she does not currently use drugs after having  used the following drugs: Marijuana. She reports that she does not drink alcohol.    Family History  Vasiliy Ferrell  family history includes Diabetes in her maternal grandmother and maternal uncle; Hypertension in her maternal grandmother and maternal uncle.    Medications and Allergies     Medications  Outpatient Medications Marked as Taking for the 12/14/23 encounter (Office Visit) with Hussein Cain MD   Medication Sig Dispense Refill    cetirizine (ZYRTEC) 5 MG tablet Take 2 tablets (10 mg total) by mouth once daily. 90 tablet 1    EScitalopram oxalate (LEXAPRO) 10 MG tablet Take 1 tablet (10 mg total) by mouth once daily. 90 tablet 1    norethindrone-ethinyl estradiol (DASETTA 7/7/7, 28,) 0.5/0.75/1 mg- 35 mcg per tablet Take 1 tablet by mouth once daily. 90 tablet 3       Allergies  Review of patient's allergies indicates:  No Known Allergies    Physical Examination     Vitals:    12/14/23 0915   BP: 104/62   Pulse: 101   Temp: 98.6 °F (37 °C)     Physical Exam  Constitutional:       Appearance: Normal appearance.   HENT:      Head: Normocephalic and atraumatic.      Right Ear: Tympanic membrane normal.      Left Ear: Tympanic membrane normal.      Nose: Congestion and rhinorrhea present.      Mouth/Throat:      Pharynx: Posterior oropharyngeal erythema present.   Eyes:      Pupils: Pupils are equal, round, and reactive to light.   Cardiovascular:      Rate and Rhythm: Normal rate and regular rhythm.      Pulses: Normal pulses.      Heart sounds: Normal heart sounds.   Pulmonary:      Breath sounds: No wheezing or rhonchi.   Abdominal:      Palpations: Abdomen is soft.   Lymphadenopathy:      Cervical: Cervical adenopathy present.   Skin:     General: Skin is warm and dry.   Neurological:      Mental Status: She is alert.          Assessment and Plan (including Health Maintenance)   :    Plan:         Health Maintenance Due   Topic Date Due    Hepatitis C Screening  Never done    Lipid Panel  Never  done    COVID-19 Vaccine (1) Never done    HPV Vaccines (1 - 2-dose series) Never done    HIV Screening  Never done    TETANUS VACCINE  Never done    Influenza Vaccine (1) Never done       Problem List Items Addressed This Visit    None  Visit Diagnoses       High risk sexual behavior, unspecified type    -  Primary    Relevant Orders    Chlamydia/GC, PCR (Completed)    Chlamydia              High risk sexual behavior, unspecified type  -     Chlamydia/GC, PCR; Future; Expected date: 12/14/2023    Chlamydia    Other orders  -     dexAMETHasone injection 4 mg  -     methylPREDNISolone acetate injection 40 mg  -     cefTRIAXone injection 1 g  -     azithromycin (ZITHROMAX) 500 MG tablet; Take 2 tablets (1,000 mg total) by mouth once. for 1 dose  Dispense: 2 tablet; Refill: 0       Health Maintenance Topics with due status: Not Due       Topic Last Completion Date    Chlamydia Screening 12/14/2023       Procedures     No future appointments.     No follow-ups on file.       Signature:  Hussein Cain MD  [facility]  [address] [place] [address]  [phone] Phone  [phone] Fax    Date of encounter: 12/14/23

## 2023-12-15 LAB
CHLAMYDIA BY PCR: POSITIVE
N. GONORRHOEAE (GC) BY PCR: NEGATIVE

## 2023-12-19 ENCOUNTER — TELEPHONE (OUTPATIENT)
Dept: FAMILY MEDICINE | Facility: CLINIC | Age: 18
End: 2023-12-19
Payer: MEDICAID

## 2023-12-19 RX ORDER — AZITHROMYCIN 500 MG/1
1000 TABLET, FILM COATED ORAL ONCE
Qty: 2 TABLET | Refills: 0 | Status: SHIPPED | OUTPATIENT
Start: 2023-12-19 | End: 2023-12-19

## 2023-12-19 RX ORDER — AZITHROMYCIN 500 MG/1
1000 TABLET, FILM COATED ORAL ONCE
COMMUNITY
Start: 2023-12-14 | End: 2023-12-19 | Stop reason: SDUPTHER

## 2023-12-19 NOTE — TELEPHONE ENCOUNTER
Spoke with patient regarding test results. Informed patient chlamydia + with last urine. Medication prescribed that is effective to infection. Instructed patient per Dr. Cain, may come by and recheck urine. Instructed patient that partner should be treated as well for infection. Patient verbalized understanding. Denies any other needs at present.

## 2023-12-19 NOTE — TELEPHONE ENCOUNTER
----- Message from Milagro Morris sent at 12/19/2023  9:38 AM CST -----  Pt checking on test results - call back # [phone]
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
## 2024-03-06 ENCOUNTER — OFFICE VISIT (OUTPATIENT)
Dept: FAMILY MEDICINE | Facility: CLINIC | Age: 19
End: 2024-03-06
Payer: MEDICAID

## 2024-03-06 VITALS
OXYGEN SATURATION: 98 % | HEIGHT: 64 IN | DIASTOLIC BLOOD PRESSURE: 64 MMHG | TEMPERATURE: 98 F | WEIGHT: 144.19 LBS | BODY MASS INDEX: 24.62 KG/M2 | SYSTOLIC BLOOD PRESSURE: 104 MMHG | HEART RATE: 86 BPM

## 2024-03-06 DIAGNOSIS — N89.8 VAGINAL DISCHARGE: ICD-10-CM

## 2024-03-06 DIAGNOSIS — Z79.899 LONG-TERM USE OF HIGH-RISK MEDICATION: Primary | ICD-10-CM

## 2024-03-06 DIAGNOSIS — A74.9 CHLAMYDIA: ICD-10-CM

## 2024-03-06 PROCEDURE — 96372 THER/PROPH/DIAG INJ SC/IM: CPT | Mod: ,,, | Performed by: FAMILY MEDICINE

## 2024-03-06 PROCEDURE — 3008F BODY MASS INDEX DOCD: CPT | Mod: ,,, | Performed by: FAMILY MEDICINE

## 2024-03-06 PROCEDURE — 87491 CHLMYD TRACH DNA AMP PROBE: CPT | Mod: ,,, | Performed by: CLINICAL MEDICAL LABORATORY

## 2024-03-06 PROCEDURE — 3074F SYST BP LT 130 MM HG: CPT | Mod: ,,, | Performed by: FAMILY MEDICINE

## 2024-03-06 PROCEDURE — 99213 OFFICE O/P EST LOW 20 MIN: CPT | Mod: 25,,, | Performed by: FAMILY MEDICINE

## 2024-03-06 PROCEDURE — 87591 N.GONORRHOEAE DNA AMP PROB: CPT | Mod: ,,, | Performed by: CLINICAL MEDICAL LABORATORY

## 2024-03-06 PROCEDURE — 3078F DIAST BP <80 MM HG: CPT | Mod: ,,, | Performed by: FAMILY MEDICINE

## 2024-03-06 RX ORDER — CETIRIZINE HYDROCHLORIDE 5 MG/1
10 TABLET ORAL DAILY
Qty: 90 TABLET | Refills: 1 | Status: SHIPPED | OUTPATIENT
Start: 2024-03-06

## 2024-03-06 RX ORDER — AZITHROMYCIN 1 G/1
1 POWDER, FOR SUSPENSION ORAL ONCE
Qty: 1 PACKET | Refills: 0 | Status: SHIPPED | OUTPATIENT
Start: 2024-03-06 | End: 2024-03-06

## 2024-03-06 RX ORDER — CEFTRIAXONE 1 G/1
1 INJECTION, POWDER, FOR SOLUTION INTRAMUSCULAR; INTRAVENOUS
Status: COMPLETED | OUTPATIENT
Start: 2024-03-06 | End: 2024-03-06

## 2024-03-06 RX ADMIN — CEFTRIAXONE 1 G: 1 INJECTION, POWDER, FOR SOLUTION INTRAMUSCULAR; INTRAVENOUS at 09:03

## 2024-03-06 NOTE — PROGRESS NOTES
Hussein Cain MD   [facility]  [address] [place] [address]     PATIENT NAME: Vasiliy Ferrell  : 2005  DATE: 3/6/24  MRN: [id number]      Billing Provider: Hussein Cain MD  Level of Service: [place] OFFICE/OUTPT VISIT, EST, LEVL III, 20-29 MIN  Patient PCP Information       Provider PCP Type    Hussein Cain MD General            Reason for Visit / Chief Complaint: Vaginal Discharge (Vaginal discharge that comes and goes x a couple days. Patient is concerned with chlamydia.  )         History of Present Illness / Problem Focused Workflow     Vasiliy Ferrell presents to the clinic with Vaginal Discharge (Vaginal discharge that comes and goes x a couple days. Patient is concerned with chlamydia.  )     HPI    Review of Systems     Review of Systems   Constitutional:  Negative for activity change, appetite change, fatigue and fever.   HENT:  Negative for nasal congestion, ear pain, hearing loss, sinus pressure/congestion and sore throat.    Respiratory:  Negative for cough, chest tightness and shortness of breath.    Cardiovascular:  Negative for chest pain and palpitations.   Gastrointestinal:  Negative for abdominal pain and fecal incontinence.   Genitourinary:  Positive for vaginal discharge. Negative for bladder incontinence and difficulty urinating.   Musculoskeletal:  Negative for arthralgias.   Integumentary:  Negative for rash.   Neurological:  Negative for dizziness and headaches.        Medical / Social / Family History   History reviewed. No pertinent past medical history.    Past Surgical History:   Procedure Laterality Date    BREAST SURGERY Left     cyst extraction       Social History  Vasiliy Ferrell  reports that she has never smoked. She has never used smokeless tobacco. She reports that she does not currently use drugs after having used the following drugs: Marijuana. She reports that she does not drink alcohol.    Family History  Vasiliy Ferrell   family history includes Diabetes in her maternal grandmother and maternal uncle; Hypertension in her maternal grandmother and maternal uncle.    Medications and Allergies     Medications  Outpatient Medications Marked as Taking for the 3/6/24 encounter (Office Visit) with Hussein Cain MD   Medication Sig Dispense Refill    EScitalopram oxalate (LEXAPRO) 10 MG tablet Take 1 tablet (10 mg total) by mouth once daily. 90 tablet 1    norethindrone-ethinyl estradiol (DASETTA 7/7/7, 28,) 0.5/0.75/1 mg- 35 mcg per tablet Take 1 tablet by mouth once daily. 90 tablet 3    [DISCONTINUED] cetirizine (ZYRTEC) 5 MG tablet Take 2 tablets (10 mg total) by mouth once daily. 90 tablet 1       Allergies  Review of patient's allergies indicates:  No Known Allergies    Physical Examination     Vitals:    03/06/24 0850   BP: 104/64   Pulse: 86   Temp: 97.9 °F (36.6 °C)     Physical Exam  Constitutional:       General: She is not in acute distress.     Appearance: She is not ill-appearing.   HENT:      Head: Normocephalic and atraumatic.      Right Ear: Tympanic membrane and ear canal normal.      Left Ear: Tympanic membrane and ear canal normal.      Nose: Nose normal. No congestion or rhinorrhea.   Eyes:      Pupils: Pupils are equal, round, and reactive to light.   Cardiovascular:      Rate and Rhythm: Normal rate and regular rhythm.      Pulses: Normal pulses.      Heart sounds: No murmur heard.  Pulmonary:      Effort: No respiratory distress.      Breath sounds: No wheezing, rhonchi or rales.   Abdominal:      General: Bowel sounds are normal.      Palpations: Abdomen is soft.      Tenderness: There is no abdominal tenderness.      Hernia: No hernia is present.   Musculoskeletal:      Cervical back: Normal range of motion and neck supple.   Lymphadenopathy:      Cervical: No cervical adenopathy.   Skin:     General: Skin is warm and dry.   Neurological:      Mental Status: She is alert.   Psychiatric:         Behavior:  Behavior normal.         Thought Content: Thought content normal.          Assessment and Plan (including Health Maintenance)   :    Plan:         Health Maintenance Due   Topic Date Due    Hepatitis C Screening  Never done    Lipid Panel  Never done    COVID-19 Vaccine (1) Never done    HPV Vaccines (1 - 2-dose series) Never done    HIV Screening  Never done    TETANUS VACCINE  Never done    Influenza Vaccine (1) Never done       Problem List Items Addressed This Visit    None  Visit Diagnoses       Long-term use of high-risk medication    -  Primary    Vaginal discharge        Relevant Medications    cefTRIAXone injection 1 g (Completed)    Other Relevant Orders    Chlamydia/GC, PCR (Completed)    Chlamydia              Long-term use of high-risk medication    Vaginal discharge  -     Chlamydia/GC, PCR; Future; Expected date: 03/06/2024  -     cefTRIAXone injection 1 g  -     azithromycin (ZITHROMAX) 1 gram Pack; Take 1 g by mouth once. for 1 dose  Dispense: 1 packet; Refill: 0    Chlamydia       Health Maintenance Topics with due status: Not Due       Topic Last Completion Date    Chlamydia Screening 03/06/2024       Procedures     No future appointments.     No follow-ups on file.       Signature:  Hussein Cain MD  [facility]  [address] [address]  [phone] Phone  [phone] Fax    Date of encounter: 3/6/24

## 2024-03-07 LAB
CHLAMYDIA BY PCR: NEGATIVE
N. GONORRHOEAE (GC) BY PCR: NEGATIVE

## 2024-03-13 ENCOUNTER — TELEPHONE (OUTPATIENT)
Dept: FAMILY MEDICINE | Facility: CLINIC | Age: 19
End: 2024-03-13
Payer: MEDICAID

## 2024-03-13 NOTE — TELEPHONE ENCOUNTER
----- Message from Susan Mallory sent at 3/12/2024  2:22 PM CDT -----  Regarding: result  Patient is calling for lab urine result. Please call her @ [phone]

## 2024-03-13 NOTE — TELEPHONE ENCOUNTER
Notified patient lab results from 03/06/24 were negative. Patient verbalized understanding instructed patient to call or return to clinic with any concerns.

## 2024-04-16 ENCOUNTER — OFFICE VISIT (OUTPATIENT)
Dept: FAMILY MEDICINE | Facility: CLINIC | Age: 19
End: 2024-04-16
Payer: MEDICAID

## 2024-04-16 VITALS
SYSTOLIC BLOOD PRESSURE: 106 MMHG | OXYGEN SATURATION: 98 % | HEART RATE: 66 BPM | TEMPERATURE: 98 F | DIASTOLIC BLOOD PRESSURE: 72 MMHG | HEIGHT: 64 IN | WEIGHT: 142.13 LBS | BODY MASS INDEX: 24.27 KG/M2

## 2024-04-16 DIAGNOSIS — Z79.899 LONG-TERM USE OF HIGH-RISK MEDICATION: Primary | ICD-10-CM

## 2024-04-16 DIAGNOSIS — Z13.220 SCREENING FOR LIPID DISORDERS: ICD-10-CM

## 2024-04-16 DIAGNOSIS — F41.9 ANXIETY: ICD-10-CM

## 2024-04-16 LAB
ALBUMIN SERPL BCP-MCNC: 3.6 G/DL (ref 3.5–5)
ALBUMIN/GLOB SERPL: 1 {RATIO}
ALP SERPL-CCNC: 71 U/L (ref 52–144)
ALT SERPL W P-5'-P-CCNC: 36 U/L (ref 13–56)
ANION GAP SERPL CALCULATED.3IONS-SCNC: 10 MMOL/L (ref 7–16)
AST SERPL W P-5'-P-CCNC: 22 U/L (ref 15–37)
BASOPHILS # BLD AUTO: 0.05 K/UL (ref 0–0.2)
BASOPHILS NFR BLD AUTO: 1 % (ref 0–1)
BILIRUB SERPL-MCNC: 1.1 MG/DL (ref ?–1.2)
BUN SERPL-MCNC: 10 MG/DL (ref 7–18)
BUN/CREAT SERPL: 13 (ref 6–20)
CALCIUM SERPL-MCNC: 9.1 MG/DL (ref 8.5–10.1)
CHLORIDE SERPL-SCNC: 107 MMOL/L (ref 98–107)
CHOLEST SERPL-MCNC: 123 MG/DL (ref 0–200)
CHOLEST/HDLC SERPL: 1.8 {RATIO}
CO2 SERPL-SCNC: 26 MMOL/L (ref 21–32)
CREAT SERPL-MCNC: 0.77 MG/DL (ref 0.55–1.02)
DIFFERENTIAL METHOD BLD: ABNORMAL
EGFR (NO RACE VARIABLE) (RUSH/TITUS): 113 ML/MIN/1.73M2
EOSINOPHIL # BLD AUTO: 0.17 K/UL (ref 0–0.5)
EOSINOPHIL NFR BLD AUTO: 3.3 % (ref 1–4)
ERYTHROCYTE [DISTWIDTH] IN BLOOD BY AUTOMATED COUNT: 11.9 % (ref 11.5–14.5)
GLOBULIN SER-MCNC: 3.7 G/DL (ref 2–4)
GLUCOSE SERPL-MCNC: 79 MG/DL (ref 74–106)
HCT VFR BLD AUTO: 41.7 % (ref 38–47)
HDLC SERPL-MCNC: 67 MG/DL (ref 40–60)
HGB BLD-MCNC: 13.3 G/DL (ref 12–16)
IMM GRANULOCYTES # BLD AUTO: 0.01 K/UL (ref 0–0.04)
IMM GRANULOCYTES NFR BLD: 0.2 % (ref 0–0.4)
LDLC SERPL CALC-MCNC: 44 MG/DL
LYMPHOCYTES # BLD AUTO: 1.7 K/UL (ref 1–4.8)
LYMPHOCYTES NFR BLD AUTO: 32.8 % (ref 27–41)
MCH RBC QN AUTO: 29.8 PG (ref 27–31)
MCHC RBC AUTO-ENTMCNC: 31.9 G/DL (ref 32–36)
MCV RBC AUTO: 93.5 FL (ref 80–96)
MONOCYTES # BLD AUTO: 0.38 K/UL (ref 0–0.8)
MONOCYTES NFR BLD AUTO: 7.3 % (ref 2–6)
MPC BLD CALC-MCNC: 11.4 FL (ref 9.4–12.4)
NEUTROPHILS # BLD AUTO: 2.88 K/UL (ref 1.8–7.7)
NEUTROPHILS NFR BLD AUTO: 55.4 % (ref 53–65)
NONHDLC SERPL-MCNC: 56 MG/DL
NRBC # BLD AUTO: 0 X10E3/UL
NRBC, AUTO (.00): 0 %
PLATELET # BLD AUTO: 334 K/UL (ref 150–400)
POTASSIUM SERPL-SCNC: 4.2 MMOL/L (ref 3.5–5.1)
PROT SERPL-MCNC: 7.3 G/DL (ref 6.4–8.2)
RBC # BLD AUTO: 4.46 M/UL (ref 4.2–5.4)
SODIUM SERPL-SCNC: 139 MMOL/L (ref 136–145)
TRIGL SERPL-MCNC: 62 MG/DL (ref 35–150)
VLDLC SERPL-MCNC: 12 MG/DL
WBC # BLD AUTO: 5.19 K/UL (ref 4.5–11)

## 2024-04-16 PROCEDURE — 3008F BODY MASS INDEX DOCD: CPT | Mod: ,,, | Performed by: FAMILY MEDICINE

## 2024-04-16 PROCEDURE — 3074F SYST BP LT 130 MM HG: CPT | Mod: ,,, | Performed by: FAMILY MEDICINE

## 2024-04-16 PROCEDURE — 85025 COMPLETE CBC W/AUTO DIFF WBC: CPT | Mod: ,,, | Performed by: CLINICAL MEDICAL LABORATORY

## 2024-04-16 PROCEDURE — 3078F DIAST BP <80 MM HG: CPT | Mod: ,,, | Performed by: FAMILY MEDICINE

## 2024-04-16 PROCEDURE — 99213 OFFICE O/P EST LOW 20 MIN: CPT | Mod: ,,, | Performed by: FAMILY MEDICINE

## 2024-04-16 PROCEDURE — 80061 LIPID PANEL: CPT | Mod: ,,, | Performed by: CLINICAL MEDICAL LABORATORY

## 2024-04-16 PROCEDURE — 80053 COMPREHEN METABOLIC PANEL: CPT | Mod: ,,, | Performed by: CLINICAL MEDICAL LABORATORY

## 2024-04-16 RX ORDER — ESCITALOPRAM OXALATE 10 MG/1
10 TABLET ORAL DAILY
Qty: 90 TABLET | Refills: 1 | Status: SHIPPED | OUTPATIENT
Start: 2024-04-16

## 2024-04-16 NOTE — PROGRESS NOTES
Hussein Cain MD   [facility]  [address] [place] [address]     PATIENT NAME: Vasiliy Ferrell  : 2005  DATE: 24  MRN: [id number]      Billing Provider: Hussein Cain MD  Level of Service: [place] OFFICE/OUTPT VISIT, EST, LEVL III, 20-29 MIN  Patient PCP Information       Provider PCP Type    Hussein Cain MD General            Reason for Visit / Chief Complaint: Depression (Refill Lexapro)         History of Present Illness / Problem Focused Workflow     Vasiliy Ferrell presents to the clinic with Depression (Refill Lexapro)     HPI    Review of Systems     Review of Systems   Constitutional:  Negative for activity change, appetite change, fatigue and fever.   HENT:  Negative for nasal congestion, ear pain, hearing loss, sinus pressure/congestion and sore throat.    Respiratory:  Negative for cough, chest tightness and shortness of breath.    Cardiovascular:  Negative for chest pain and palpitations.   Gastrointestinal:  Negative for abdominal pain and fecal incontinence.   Genitourinary:  Negative for bladder incontinence and difficulty urinating.   Musculoskeletal:  Negative for arthralgias.   Integumentary:  Negative for rash.   Neurological:  Negative for dizziness and headaches.        Medical / Social / Family History   No past medical history on file.    Past Surgical History:   Procedure Laterality Date    BREAST SURGERY Left     cyst extraction       Social History  Vasiliy Ferrell  reports that she has never smoked. She has never used smokeless tobacco. She reports that she does not currently use drugs after having used the following drugs: Marijuana. She reports that she does not drink alcohol.    Family History  Vasiliy Ferrell  family history includes Diabetes in her maternal grandmother and maternal uncle; Hypertension in her maternal grandmother and maternal uncle.    Medications and Allergies     Medications  Current Outpatient Medications    Medication Sig Dispense Refill    cetirizine (ZYRTEC) 5 MG tablet Take 2 tablets (10 mg total) by mouth once daily. 90 tablet 1    norethindrone-ethinyl estradiol (DASETTA 7/7/7, 28,) 0.5/0.75/1 mg- 35 mcg per tablet Take 1 tablet by mouth once daily. 90 tablet 3    acyclovir (ZOVIRAX) 800 MG Tab Take 1 tablet (800 mg total) by mouth 5 (five) times daily. (Patient not taking: Reported on 6/29/2023) 35 tablet 1    EScitalopram oxalate (LEXAPRO) 10 MG tablet Take 1 tablet (10 mg total) by mouth once daily. 90 tablet 1     No current facility-administered medications for this visit.       Allergies  Review of patient's allergies indicates:  No Known Allergies    Physical Examination     Vitals:    04/16/24 1056   BP: 106/72   Pulse: 66   Temp: 98 °F (36.7 °C)     Physical Exam  Constitutional:       General: She is not in acute distress.     Appearance: She is not ill-appearing.   HENT:      Head: Normocephalic and atraumatic.      Right Ear: Tympanic membrane and ear canal normal.      Left Ear: Tympanic membrane and ear canal normal.      Nose: Nose normal. No congestion or rhinorrhea.   Eyes:      Pupils: Pupils are equal, round, and reactive to light.   Cardiovascular:      Rate and Rhythm: Normal rate and regular rhythm.      Pulses: Normal pulses.      Heart sounds: No murmur heard.  Pulmonary:      Effort: No respiratory distress.      Breath sounds: No wheezing, rhonchi or rales.   Abdominal:      General: Bowel sounds are normal.      Palpations: Abdomen is soft.      Tenderness: There is no abdominal tenderness.      Hernia: No hernia is present.   Musculoskeletal:      Cervical back: Normal range of motion and neck supple.   Lymphadenopathy:      Cervical: No cervical adenopathy.   Skin:     General: Skin is warm and dry.   Neurological:      Mental Status: She is alert.   Psychiatric:         Behavior: Behavior normal.         Thought Content: Thought content normal.          Assessment and Plan  (including Health Maintenance)   :    Plan:         Health Maintenance Due   Topic Date Due    Hepatitis C Screening  Never done    HIV Screening  Never done    HPV Vaccines (1 - 3-dose series) Never done    TETANUS VACCINE  Never done    Influenza Vaccine (1) Never done    COVID-19 Vaccine (1 - 2023-24 season) Never done       Problem List Items Addressed This Visit          Psychiatric    Anxiety     Other Visit Diagnoses       Long-term use of high-risk medication    -  Primary    Relevant Orders    CBC Auto Differential (Completed)    Comprehensive Metabolic Panel (Completed)    Screening for lipid disorders        Relevant Orders    Lipid Panel (Completed)          Long-term use of high-risk medication  -     CBC Auto Differential; Future; Expected date: 04/16/2024  -     Comprehensive Metabolic Panel; Future; Expected date: 04/16/2024    Screening for lipid disorders  -     Lipid Panel; Future; Expected date: 04/16/2024    Anxiety    Other orders  -     EScitalopram oxalate (LEXAPRO) 10 MG tablet; Take 1 tablet (10 mg total) by mouth once daily.  Dispense: 90 tablet; Refill: 1       Health Maintenance Topics with due status: Not Due       Topic Last Completion Date    Chlamydia Screening 03/06/2024       Procedures     No future appointments.     No follow-ups on file.       Signature:  Hussein Cain MD  [facility]  [address] [place] [address]  [phone] Phone  [phone] Fax    Date of encounter: 4/16/24

## 2024-11-18 RX ORDER — NORETHINDRONE AND ETHINYL ESTRADIOL 7 DAYS X 3
1 KIT ORAL
Qty: 84 TABLET | Refills: 0 | Status: SHIPPED | OUTPATIENT
Start: 2024-11-18

## 2024-11-18 NOTE — TELEPHONE ENCOUNTER
Request refill on birth control. Visit to be scheduled. Patient [facility]

## 2024-11-18 NOTE — TELEPHONE ENCOUNTER
----- Message from Frances sent at 11/18/2024 12:49 PM CST -----  Regarding: PT CALL BACK  Who Called: Vasiliy Ferrell    Caller is requesting assistance/information from provider's office.        Preferred Method of Contact: Phone Call  Patient's Preferred Phone Number on File: [phone]

## 2024-12-03 ENCOUNTER — OFFICE VISIT (OUTPATIENT)
Dept: FAMILY MEDICINE | Facility: CLINIC | Age: 19
End: 2024-12-03
Payer: MEDICAID

## 2024-12-03 VITALS
OXYGEN SATURATION: 99 % | HEART RATE: 67 BPM | TEMPERATURE: 98 F | SYSTOLIC BLOOD PRESSURE: 110 MMHG | DIASTOLIC BLOOD PRESSURE: 68 MMHG | BODY MASS INDEX: 23.05 KG/M2 | HEIGHT: 64 IN | WEIGHT: 135 LBS

## 2024-12-03 DIAGNOSIS — F41.9 ANXIETY: ICD-10-CM

## 2024-12-03 DIAGNOSIS — Z30.41 ENCOUNTER FOR SURVEILLANCE OF CONTRACEPTIVE PILLS: ICD-10-CM

## 2024-12-03 DIAGNOSIS — R30.0 DYSURIA: Primary | ICD-10-CM

## 2024-12-03 LAB
BILIRUB SERPL-MCNC: NEGATIVE MG/DL
BLOOD, POC UA: ABNORMAL
GLUCOSE UR QL STRIP: NEGATIVE
KETONES UR QL STRIP: NEGATIVE
LEUKOCYTE ESTERASE URINE, POC: NEGATIVE
NITRITE, POC UA: NEGATIVE
PH, POC UA: 7
PROTEIN, POC: NEGATIVE
SPECIFIC GRAVITY, POC UA: >1.03
UROBILINOGEN, POC UA: 0.2

## 2024-12-03 PROCEDURE — 3078F DIAST BP <80 MM HG: CPT | Mod: ,,, | Performed by: FAMILY MEDICINE

## 2024-12-03 PROCEDURE — 3074F SYST BP LT 130 MM HG: CPT | Mod: ,,, | Performed by: FAMILY MEDICINE

## 2024-12-03 PROCEDURE — 81003 URINALYSIS AUTO W/O SCOPE: CPT | Mod: QW,,, | Performed by: FAMILY MEDICINE

## 2024-12-03 PROCEDURE — 99213 OFFICE O/P EST LOW 20 MIN: CPT | Mod: ,,, | Performed by: FAMILY MEDICINE

## 2024-12-03 PROCEDURE — 3008F BODY MASS INDEX DOCD: CPT | Mod: ,,, | Performed by: FAMILY MEDICINE

## 2024-12-03 RX ORDER — ESCITALOPRAM OXALATE 10 MG/1
10 TABLET ORAL DAILY
Qty: 90 TABLET | Refills: 1 | Status: SHIPPED | OUTPATIENT
Start: 2024-12-03

## 2024-12-03 RX ORDER — NORETHINDRONE AND ETHINYL ESTRADIOL 7 DAYS X 3
1 KIT ORAL DAILY
Qty: 84 TABLET | Refills: 3 | Status: SHIPPED | OUTPATIENT
Start: 2024-12-03

## 2024-12-03 NOTE — PROGRESS NOTES
Hussein Cain MD   [facility]  [address] [place] [address]     PATIENT NAME: Vasiliy Ferrell  : 2005  DATE: 12/3/24  MRN: [id number]      Billing Provider: Hussein Cain MD  Level of Service: [place] OFFICE/OUTPT VISIT, EST, LEVL III, 20-29 MIN  Patient PCP Information       Provider PCP Type    Hussein Cain MD General            Reason for Visit / Chief Complaint: Contraception (Requesting refill on BCP. States she last had a PAP at the [facility] in [place] this year.) and Anxiety (Refill Lexapro)         History of Present Illness / Problem Focused Workflow     Vasiliy Ferrell presents to the clinic with Contraception (Requesting refill on BCP. States she last had a PAP at the health department in [place] this year.) and Anxiety (Refill Lexapro)     HPI    Review of Systems     Review of Systems   Constitutional:  Negative for activity change, appetite change, fatigue and fever.   HENT:  Negative for nasal congestion, ear pain, hearing loss, sinus pressure/congestion and sore throat.    Respiratory:  Negative for cough, chest tightness and shortness of breath.    Cardiovascular:  Negative for chest pain and palpitations.   Gastrointestinal:  Negative for abdominal pain and fecal incontinence.   Genitourinary:  Negative for bladder incontinence and difficulty urinating.   Musculoskeletal:  Negative for arthralgias.   Integumentary:  Negative for rash.   Neurological:  Negative for dizziness and headaches.   Psychiatric/Behavioral:  Positive for decreased concentration. The patient is nervous/anxious.         Medical / Social / Family History   History reviewed. No pertinent past medical history.    Past Surgical History:   Procedure Laterality Date    BREAST SURGERY Left     cyst extraction       Social History  Vasiliy Ferrell  reports that she has never smoked. She has never used smokeless tobacco. She reports that she does not currently use drugs  after having used the following drugs: Marijuana. She reports that she does not drink alcohol.    Family History  Vasiliy Ferrell  family history includes Diabetes in her maternal grandmother and maternal uncle; Hypertension in her maternal grandmother and maternal uncle.    Medications and Allergies     Medications  Outpatient Medications Marked as Taking for the 12/3/24 encounter (Office Visit) with Hussein Cain MD   Medication Sig Dispense Refill    cetirizine (ZYRTEC) 5 MG tablet Take 2 tablets (10 mg total) by mouth once daily. 90 tablet 1    [DISCONTINUED] ALYACEN 7/7/7, 28, 0.5/0.75/1 mg- 35 mcg per tablet TAKE 1 TABLET BY MOUTH ONCE DAILY. 84 tablet 0    [DISCONTINUED] EScitalopram oxalate (LEXAPRO) 10 MG tablet Take 1 tablet (10 mg total) by mouth once daily. 90 tablet 1       Allergies  Review of patient's allergies indicates:  No Known Allergies    Physical Examination     Vitals:    12/03/24 0748   BP: 110/68   Pulse: 67   Temp: 98.2 °F (36.8 °C)     Physical Exam  Constitutional:       General: She is not in acute distress.     Appearance: She is not ill-appearing.   HENT:      Head: Normocephalic and atraumatic.      Right Ear: Tympanic membrane and ear canal normal.      Left Ear: Tympanic membrane and ear canal normal.      Nose: Nose normal. No congestion or rhinorrhea.   Eyes:      Pupils: Pupils are equal, round, and reactive to light.   Cardiovascular:      Rate and Rhythm: Normal rate and regular rhythm.      Pulses: Normal pulses.      Heart sounds: No murmur heard.  Pulmonary:      Effort: No respiratory distress.      Breath sounds: No wheezing, rhonchi or rales.   Abdominal:      General: Bowel sounds are normal.      Palpations: Abdomen is soft.      Tenderness: There is no abdominal tenderness.      Hernia: No hernia is present.   Musculoskeletal:      Cervical back: Normal range of motion and neck supple.   Lymphadenopathy:      Cervical: No cervical adenopathy.   Skin:      General: Skin is warm and dry.   Neurological:      Mental Status: She is alert.   Psychiatric:         Behavior: Behavior normal.         Thought Content: Thought content normal.          Assessment and Plan (including Health Maintenance)   :    Plan:         Health Maintenance Due   Topic Date Due    Hepatitis C Screening  Never done    HIV Screening  Never done    HPV Vaccines (1 - 3-dose series) Never done    TETANUS VACCINE  Never done    Influenza Vaccine (1) Never done    COVID-19 Vaccine (1 - 2024-25 season) Never done       Problem List Items Addressed This Visit          Psychiatric    Anxiety     Other Visit Diagnoses       Dysuria    -  Primary    Relevant Orders    POCT Urinalysis (Completed)    Encounter for surveillance of contraceptive pills              Dysuria  -     POCT Urinalysis    Anxiety    Encounter for surveillance of contraceptive pills    Other orders  -     EScitalopram oxalate (LEXAPRO) 10 MG tablet; Take 1 tablet (10 mg total) by mouth once daily.  Dispense: 90 tablet; Refill: 1  -     norethindrone-ethinyl estradiol (ALYACEN 7/7/7, 28,) 0.5/0.75/1 mg- 35 mcg per tablet; Take 1 tablet by mouth once daily.  Dispense: 84 tablet; Refill: 3       Health Maintenance Topics with due status: Not Due       Topic Last Completion Date    Pap Smear 07/12/2022    Chlamydia Screening 03/06/2024    RSV Vaccine (Age 60+ and Pregnant patients) Not Due       Procedures     No future appointments.     No follow-ups on file.       Signature:  Hussein Cain MD  [facility]  [address] [place] [address]  [phone] Phone  [phone] Fax    Date of encounter: 12/3/24

## 2025-05-13 ENCOUNTER — OFFICE VISIT (OUTPATIENT)
Dept: FAMILY MEDICINE | Facility: CLINIC | Age: 20
End: 2025-05-13

## 2025-05-13 VITALS
WEIGHT: 140 LBS | SYSTOLIC BLOOD PRESSURE: 106 MMHG | HEIGHT: 64 IN | OXYGEN SATURATION: 98 % | HEART RATE: 75 BPM | TEMPERATURE: 99 F | DIASTOLIC BLOOD PRESSURE: 78 MMHG | BODY MASS INDEX: 23.9 KG/M2

## 2025-05-13 DIAGNOSIS — Z72.51 HIGH RISK HETEROSEXUAL BEHAVIOR: Primary | ICD-10-CM

## 2025-05-13 PROCEDURE — 99213 OFFICE O/P EST LOW 20 MIN: CPT | Mod: ,,, | Performed by: FAMILY MEDICINE

## 2025-05-13 RX ORDER — AZITHROMYCIN 1 G/1
1 POWDER, FOR SUSPENSION ORAL ONCE
Qty: 1 PACKET | Refills: 0 | Status: SHIPPED | OUTPATIENT
Start: 2025-05-13 | End: 2025-05-13

## 2025-05-13 RX ORDER — METRONIDAZOLE 500 MG/1
500 TABLET ORAL 2 TIMES DAILY
Qty: 14 TABLET | Refills: 0 | Status: SHIPPED | OUTPATIENT
Start: 2025-05-13 | End: 2025-05-20

## 2025-05-28 ENCOUNTER — PATIENT OUTREACH (OUTPATIENT)
Facility: HOSPITAL | Age: 20
End: 2025-05-28
Payer: COMMERCIAL